# Patient Record
Sex: FEMALE | Race: WHITE | NOT HISPANIC OR LATINO | URBAN - METROPOLITAN AREA
[De-identification: names, ages, dates, MRNs, and addresses within clinical notes are randomized per-mention and may not be internally consistent; named-entity substitution may affect disease eponyms.]

---

## 2022-05-20 ENCOUNTER — LAB OUTSIDE AN ENCOUNTER (OUTPATIENT)
Dept: URBAN - METROPOLITAN AREA CLINIC 96 | Facility: CLINIC | Age: 57
End: 2022-05-20

## 2022-05-20 PROBLEM — 197321007: Status: ACTIVE | Noted: 2022-05-20

## 2022-05-31 ENCOUNTER — OFFICE VISIT (OUTPATIENT)
Dept: URBAN - METROPOLITAN AREA CLINIC 96 | Facility: CLINIC | Age: 57
End: 2022-05-31

## 2022-05-31 RX ORDER — ONDANSETRON HYDROCHLORIDE 4 MG/1
1 TABLET AS NEEDED TABLET, FILM COATED ORAL ONCE A DAY
Qty: 2 | Refills: 0 | OUTPATIENT
Start: 2022-05-20

## 2022-05-31 RX ORDER — POLYETHYLENE GLYCOL 3350, SODIUM SULFATE, SODIUM CHLORIDE, POTASSIUM CHLORIDE, ASCORBIC ACID, SODIUM ASCORBATE 7.5-2.691G
AS DIRECTED KIT ORAL
Qty: 2 LITER | Refills: 0 | OUTPATIENT
Start: 2022-05-20 | End: 2022-05-21

## 2022-05-31 NOTE — HPI-TODAY'S VISIT:
This is a 57-year-old female referred by Dr. Kenisha Cote and Dr. Logan Diop for a GI consultation of history of colon polyps and a copy of this note be sent to the referring provider.  Patient was seen in 2019 for colon cancer screening as well as history of fatty liver and she also had a history of breast cancer.  Patient was moving her bowels once a day then.  She had multiple colonoscopies starting from 2010 and a PET scan that showed fatty liver.  She had a bilateral mastectomy in 2017 and implants and breast reconstruction 2018 and her aunt had metastatic colon cancer in her 50s.  Patient had a colonoscopy on March 22, 2019 that showed internal hemorrhoids and 2 polyps that removed under 5 mm from the hepatic flexure and transverse colon and a mildly tortuous colon.  The polyps were adenomas so I recommended repeat exam in 3 years.  I also offered to refer her to our hepatologist to discuss fatty liver as she did not have elevated triglycerides, cholesterol or history of alcohol use.

## 2022-08-16 ENCOUNTER — LAB OUTSIDE AN ENCOUNTER (OUTPATIENT)
Dept: URBAN - METROPOLITAN AREA CLINIC 96 | Facility: CLINIC | Age: 57
End: 2022-08-16

## 2022-08-16 ENCOUNTER — DASHBOARD ENCOUNTERS (OUTPATIENT)
Age: 57
End: 2022-08-16

## 2022-08-17 ENCOUNTER — OFFICE VISIT (OUTPATIENT)
Dept: URBAN - METROPOLITAN AREA CLINIC 96 | Facility: CLINIC | Age: 57
End: 2022-08-17
Payer: COMMERCIAL

## 2022-08-17 VITALS
SYSTOLIC BLOOD PRESSURE: 148 MMHG | WEIGHT: 151 LBS | TEMPERATURE: 98.4 F | DIASTOLIC BLOOD PRESSURE: 83 MMHG | HEIGHT: 61 IN | BODY MASS INDEX: 28.51 KG/M2 | HEART RATE: 97 BPM

## 2022-08-17 DIAGNOSIS — Z85.3 PERSONAL HISTORY OF BREAST CANCER: ICD-10-CM

## 2022-08-17 DIAGNOSIS — Z86.010 PERSONAL HISTORY OF COLONIC POLYPS: ICD-10-CM

## 2022-08-17 PROBLEM — 429087003: Status: ACTIVE | Noted: 2022-05-20

## 2022-08-17 PROCEDURE — 99203 OFFICE O/P NEW LOW 30 MIN: CPT | Performed by: INTERNAL MEDICINE

## 2022-08-17 RX ORDER — POLYETHYLENE GLYCOL 3350, SODIUM CHLORIDE, SODIUM BICARBONATE, POTASSIUM CHLORIDE 420; 11.2; 5.72; 1.48 G/4L; G/4L; G/4L; G/4L
AS DIRECTED POWDER, FOR SOLUTION ORAL AS DIRECTED
Qty: 4 LITER | Refills: 0 | OUTPATIENT
Start: 2022-08-17 | End: 2022-08-18

## 2022-08-17 RX ORDER — ONDANSETRON HYDROCHLORIDE 4 MG/1
1 TABLET AS NEEDED TABLET, FILM COATED ORAL ONCE A DAY
Qty: 2 | Refills: 0 | OUTPATIENT
Start: 2022-08-16

## 2022-08-17 NOTE — HPI-TODAY'S VISIT:
This is a 57-year-old female referred by Dr Rios ([facility]) for GI consultation for colon cancer screening and a copy of this note was sent to the referring provider.  I last saw her in the office in January 2019 for colon screening.  Patient also had a history of breast cancer and sees Dr. Kensiha Cote.  She also had a history of fatty liver.  She had multiple previous colonoscopies in December 2010, December 2012, February 2016.  Her PET scan showed fatty liver.  Patient had denied issues with triglycerides, cholesterol and denied alcohol use.  Her liver tests were normal except her alk phos at the time was mildly elevated.  For her breast cancer she had a bilateral mastectomy in January 2017 and implants and breast reconstruction in June 2018.  Her aunt did have colon cancer in her 50s.  I had counseled her on treatment of fatty liver with a low-fat diet and offered for her to see one of our hepatologist on the line.  A colonoscopy was done in March 22, 2019 and revealed 2 polyps as well as internal hemorrhoids.  She also had an anal skin tag seen.  Pathology revealed 2 tubular adenomas so she is here to set up her surveillance exam. Pt is doing great. Her son got  in Jan and wedding went great. He graduated from [employer] as an . Pt is doing great. She had onc visit in May. Pt moves her bowels daily.

## 2022-08-18 ENCOUNTER — TELEPHONE ENCOUNTER (OUTPATIENT)
Dept: URBAN - METROPOLITAN AREA CLINIC 118 | Facility: CLINIC | Age: 57
End: 2022-08-18

## 2022-09-23 PROBLEM — 428283002: Status: ACTIVE | Noted: 2022-05-20

## 2022-11-22 NOTE — PHYSICAL EXAM GASTROINTESTINAL
Please see LETTERS encounter from 8/18/2022    Letter states following:    We have been trying to contact you to schedule your transesophageal echocardiogramtest that was ordered by your doctor, Dr Oumar Wang.      Please call us at [phone] so that we may help you to get this scheduled.    Abdomen , soft, nontender, nondistended , no guarding or rigidity , no masses palpable , normal bowel sounds , Liver and Spleen , no hepatomegaly present , no hepatosplenomegaly , liver nontender , spleen not palpable

## 2022-12-20 ENCOUNTER — WEB ENCOUNTER (OUTPATIENT)
Dept: URBAN - METROPOLITAN AREA SURGERY CENTER 18 | Facility: SURGERY CENTER | Age: 57
End: 2022-12-20

## 2022-12-23 ENCOUNTER — CLAIMS CREATED FROM THE CLAIM WINDOW (OUTPATIENT)
Dept: URBAN - METROPOLITAN AREA SURGERY CENTER 18 | Facility: SURGERY CENTER | Age: 57
End: 2022-12-23
Payer: COMMERCIAL

## 2022-12-23 ENCOUNTER — CLAIMS CREATED FROM THE CLAIM WINDOW (OUTPATIENT)
Dept: URBAN - METROPOLITAN AREA SURGERY CENTER 18 | Facility: SURGERY CENTER | Age: 57
End: 2022-12-23

## 2022-12-23 ENCOUNTER — CLAIMS CREATED FROM THE CLAIM WINDOW (OUTPATIENT)
Dept: URBAN - METROPOLITAN AREA CLINIC 4 | Facility: CLINIC | Age: 57
End: 2022-12-23
Payer: COMMERCIAL

## 2022-12-23 DIAGNOSIS — Z86.010 ADENOMAS PERSONAL HISTORY OF COLONIC POLYPS: ICD-10-CM

## 2022-12-23 DIAGNOSIS — D12.3 BENIGN NEOPLASM OF TRANSVERSE COLON: ICD-10-CM

## 2022-12-23 DIAGNOSIS — D12.3 ADENOMA OF TRANSVERSE COLON: ICD-10-CM

## 2022-12-23 PROCEDURE — 45380 COLONOSCOPY AND BIOPSY: CPT | Performed by: INTERNAL MEDICINE

## 2022-12-23 PROCEDURE — G8907 PT DOC NO EVENTS ON DISCHARG: HCPCS | Performed by: INTERNAL MEDICINE

## 2022-12-23 PROCEDURE — 88305 TISSUE EXAM BY PATHOLOGIST: CPT | Performed by: PATHOLOGY
